# Patient Record
Sex: MALE | Race: WHITE | NOT HISPANIC OR LATINO | Employment: FULL TIME | URBAN - METROPOLITAN AREA
[De-identification: names, ages, dates, MRNs, and addresses within clinical notes are randomized per-mention and may not be internally consistent; named-entity substitution may affect disease eponyms.]

---

## 2017-06-13 ENCOUNTER — OFFICE VISIT (OUTPATIENT)
Dept: URGENT CARE | Facility: CLINIC | Age: 47
End: 2017-06-13
Payer: COMMERCIAL

## 2017-06-13 ENCOUNTER — APPOINTMENT (OUTPATIENT)
Dept: RADIOLOGY | Facility: IMAGING CENTER | Age: 47
End: 2017-06-13
Attending: NURSE PRACTITIONER
Payer: COMMERCIAL

## 2017-06-13 VITALS
HEIGHT: 78 IN | HEART RATE: 64 BPM | TEMPERATURE: 98.2 F | DIASTOLIC BLOOD PRESSURE: 78 MMHG | BODY MASS INDEX: 29.78 KG/M2 | SYSTOLIC BLOOD PRESSURE: 102 MMHG | OXYGEN SATURATION: 97 % | WEIGHT: 257.4 LBS

## 2017-06-13 DIAGNOSIS — S20.212A CONTUSION OF LEFT CHEST WALL, INITIAL ENCOUNTER: ICD-10-CM

## 2017-06-13 PROCEDURE — 99203 OFFICE O/P NEW LOW 30 MIN: CPT | Performed by: NURSE PRACTITIONER

## 2017-06-13 PROCEDURE — 71101 X-RAY EXAM UNILAT RIBS/CHEST: CPT | Mod: TC,LT | Performed by: NURSE PRACTITIONER

## 2017-06-13 RX ORDER — OXYCODONE HYDROCHLORIDE AND ACETAMINOPHEN 5; 325 MG/1; MG/1
1-2 TABLET ORAL EVERY 4 HOURS PRN
Qty: 20 TAB | Refills: 0 | Status: SHIPPED | OUTPATIENT
Start: 2017-06-13

## 2017-06-13 ASSESSMENT — ENCOUNTER SYMPTOMS
MYALGIAS: 0
CHILLS: 0
ABDOMINAL PAIN: 0
FEVER: 0
NECK PAIN: 0
BACK PAIN: 0
HEADACHES: 0
COUGH: 0
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
## 2017-06-13 NOTE — PATIENT INSTRUCTIONS
Chest Wall Pain  Chest wall pain is pain in or around the bones and muscles of your chest. It may take up to 6 weeks to get better. It may take longer if you must stay physically active in your work and activities.   CAUSES   Chest wall pain may happen on its own. However, it may be caused by:  · A viral illness like the flu.  · Injury.  · Coughing.  · Exercise.  · Arthritis.  · Fibromyalgia.  · Shingles.  HOME CARE INSTRUCTIONS   · Avoid overtiring physical activity. Try not to strain or perform activities that cause pain. This includes any activities using your chest or your abdominal and side muscles, especially if heavy weights are used.  · Put ice on the sore area.  ¨ Put ice in a plastic bag.  ¨ Place a towel between your skin and the bag.  ¨ Leave the ice on for 15-20 minutes per hour while awake for the first 2 days.  · Only take over-the-counter or prescription medicines for pain, discomfort, or fever as directed by your caregiver.  SEEK IMMEDIATE MEDICAL CARE IF:   · Your pain increases, or you are very uncomfortable.  · You have a fever.  · Your chest pain becomes worse.  · You have new, unexplained symptoms.  · You have nausea or vomiting.  · You feel sweaty or lightheaded.  · You have a cough with phlegm (sputum), or you cough up blood.  MAKE SURE YOU:   · Understand these instructions.  · Will watch your condition.  · Will get help right away if you are not doing well or get worse.     This information is not intended to replace advice given to you by your health care provider. Make sure you discuss any questions you have with your health care provider.     Document Released: 12/18/2006 Document Revised: 03/11/2013 Document Reviewed: 03/14/2016  [employer] Interactive Patient Education ©2016 [employer]
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
## 2017-08-28 ENCOUNTER — HOSPITAL ENCOUNTER (OUTPATIENT)
Dept: RADIOLOGY | Facility: MEDICAL CENTER | Age: 47
End: 2017-08-28
Attending: CHIROPRACTOR
Payer: COMMERCIAL

## 2017-08-28 DIAGNOSIS — M99.03 SOMATIC DYSFUNCTION OF LUMBAR REGION: ICD-10-CM

## 2017-08-28 DIAGNOSIS — M99.02 THORACIC SEGMENT DYSFUNCTION: ICD-10-CM

## 2017-08-28 DIAGNOSIS — M99.04 SOMATIC DYSFUNCTION OF SACRAL REGION: ICD-10-CM

## 2017-08-28 DIAGNOSIS — M99.01 CERVICAL SEGMENT DYSFUNCTION: ICD-10-CM

## 2017-08-28 PROCEDURE — 72052 X-RAY EXAM NECK SPINE 6/>VWS: CPT

## 2017-08-28 PROCEDURE — 72072 X-RAY EXAM THORAC SPINE 3VWS: CPT

## 2017-08-28 PROCEDURE — 72170 X-RAY EXAM OF PELVIS: CPT

## 2017-08-28 PROCEDURE — 72100 X-RAY EXAM L-S SPINE 2/3 VWS: CPT

## 2017-10-14 ENCOUNTER — OFFICE VISIT (OUTPATIENT)
Dept: URGENT CARE | Facility: CLINIC | Age: 47
End: 2017-10-14
Payer: COMMERCIAL

## 2017-10-14 VITALS
HEIGHT: 78 IN | TEMPERATURE: 97.8 F | SYSTOLIC BLOOD PRESSURE: 126 MMHG | BODY MASS INDEX: 29.9 KG/M2 | HEART RATE: 72 BPM | DIASTOLIC BLOOD PRESSURE: 60 MMHG | RESPIRATION RATE: 14 BRPM | OXYGEN SATURATION: 97 % | WEIGHT: 258.4 LBS

## 2017-10-14 DIAGNOSIS — R05.9 COUGH: ICD-10-CM

## 2017-10-14 DIAGNOSIS — J02.9 PHARYNGITIS, UNSPECIFIED ETIOLOGY: ICD-10-CM

## 2017-10-14 PROCEDURE — 99214 OFFICE O/P EST MOD 30 MIN: CPT | Performed by: PHYSICIAN ASSISTANT

## 2017-10-14 RX ORDER — AZITHROMYCIN 250 MG/1
TABLET, FILM COATED ORAL
Qty: 6 TAB | Refills: 1 | Status: SHIPPED | OUTPATIENT
Start: 2017-10-14

## 2017-10-14 ASSESSMENT — ENCOUNTER SYMPTOMS
CARDIOVASCULAR NEGATIVE: 1
COUGH: 1
CONSTITUTIONAL NEGATIVE: 1
EYES NEGATIVE: 1
SPUTUM PRODUCTION: 0
SORE THROAT: 1
FEVER: 0
SHORTNESS OF BREATH: 0

## 2017-10-14 NOTE — PROGRESS NOTES
"Subjective:      Tristan Gaspar is a 47 y.o. male who presents with Cough (x1week, dry cough, chest congestion, body aches, chills, fever, throat irritation)            Cough   This is a new problem. The current episode started in the past 7 days (cough, st, aches). The problem has been unchanged. The problem occurs every few minutes. The cough is non-productive. Associated symptoms include a sore throat. Pertinent negatives include no fever or shortness of breath. Nothing aggravates the symptoms. He has tried nothing for the symptoms. The treatment provided no relief. There is no history of asthma or environmental allergies.       Review of Systems   Constitutional: Negative.  Negative for fever.   HENT: Positive for sore throat.    Eyes: Negative.    Respiratory: Positive for cough. Negative for sputum production and shortness of breath.    Cardiovascular: Negative.    Skin: Negative.    Endo/Heme/Allergies: Negative for environmental allergies.          Objective:     /60   Pulse 72   Temp 36.6 °C (97.8 °F)   Resp 14   Ht 2.007 m (6' 7\")   Wt 117.2 kg (258 lb 6.4 oz)   SpO2 97%   BMI 29.11 kg/m²      Physical Exam   Constitutional: He is oriented to person, place, and time. He appears well-developed and well-nourished. No distress.   HENT:   Head: Normocephalic and atraumatic.   +phar./tons redn     Eyes: EOM are normal. Pupils are equal, round, and reactive to light.   Neck: Normal range of motion. Neck supple.   Cardiovascular: Normal rate.    Pulmonary/Chest: Effort normal and breath sounds normal. No respiratory distress.   Lymphadenopathy:     He has cervical adenopathy.   Neurological: He is alert and oriented to person, place, and time.   Skin: Skin is warm and dry.   Psychiatric: He has a normal mood and affect. His behavior is normal. Thought content normal.   Nursing note and vitals reviewed.    Vitals:    10/14/17 1150   BP: 126/60   Pulse: 72   Resp: 14   Temp: 36.6 °C (97.8 °F)   SpO2: " "97%   Weight: 117.2 kg (258 lb 6.4 oz)   Height: 2.007 m (6' 7\")     Active Ambulatory Problems     Diagnosis Date Noted   • No Active Ambulatory Problems     Resolved Ambulatory Problems     Diagnosis Date Noted   • No Resolved Ambulatory Problems     No Additional Past Medical History     Current Outpatient Prescriptions on File Prior to Visit   Medication Sig Dispense Refill   • oxycodone-acetaminophen (PERCOCET) 5-325 MG Tab Take 1-2 Tabs by mouth every four hours as needed for Moderate Pain or Severe Pain (pain). 20 Tab 0   • ondansetron (ZOFRAN) 4 MG TABS Take 1 Tab by mouth every 8 hours as needed for Nausea/Vomiting. 10 Each 1   • tamsulosin (FLOMAX) 0.4 MG capsule Take 1 Cap by mouth ONE-HALF HOUR AFTER BREAKFAST. 30 Cap 0     No current facility-administered medications on file prior to visit.      Gargles, Cepacol lozenges, Aleve/Advil as needed for throat pain  History reviewed. No pertinent family history.  Review of patient's allergies indicates no known allergies.              Assessment/Plan:     ·  tonsillitis, cough      · Start w/MucinexD; nsaids; [hold rx abx prn sx persist/worsen]  ·        "

## 2017-11-01 ENCOUNTER — TELEPHONE (OUTPATIENT)
Dept: URGENT CARE | Facility: CLINIC | Age: 47
End: 2017-11-01

## 2017-11-01 DIAGNOSIS — R05.9 COUGH: ICD-10-CM

## 2017-11-01 RX ORDER — LEVOFLOXACIN 500 MG/1
500 TABLET, FILM COATED ORAL DAILY
Qty: 10 TAB | Refills: 0 | Status: SHIPPED | OUTPATIENT
Start: 2017-11-01 | End: 2017-11-11

## 2018-11-27 ENCOUNTER — APPOINTMENT (RX ONLY)
Dept: URBAN - METROPOLITAN AREA CLINIC 20 | Facility: CLINIC | Age: 48
Setting detail: DERMATOLOGY
End: 2018-11-27

## 2018-11-27 DIAGNOSIS — L81.4 OTHER MELANIN HYPERPIGMENTATION: ICD-10-CM

## 2018-11-27 DIAGNOSIS — L57.0 ACTINIC KERATOSIS: ICD-10-CM

## 2018-11-27 DIAGNOSIS — L21.8 OTHER SEBORRHEIC DERMATITIS: ICD-10-CM | Status: WELL CONTROLLED

## 2018-11-27 PROBLEM — D48.5 NEOPLASM OF UNCERTAIN BEHAVIOR OF SKIN: Status: ACTIVE | Noted: 2018-11-27

## 2018-11-27 PROCEDURE — ? LIQUID NITROGEN

## 2018-11-27 PROCEDURE — ? COUNSELING

## 2018-11-27 PROCEDURE — 17000 DESTRUCT PREMALG LESION: CPT

## 2018-11-27 PROCEDURE — 17003 DESTRUCT PREMALG LES 2-14: CPT

## 2018-11-27 PROCEDURE — ? BIOPSY BY SHAVE METHOD

## 2018-11-27 PROCEDURE — ? PRESCRIPTION

## 2018-11-27 PROCEDURE — 11100: CPT | Mod: 59

## 2018-11-27 PROCEDURE — 99213 OFFICE O/P EST LOW 20 MIN: CPT | Mod: 25

## 2018-11-27 PROCEDURE — ? OBSERVATION AND MEASURE

## 2018-11-27 RX ORDER — KETOCONAZOLE 20.5 MG/ML
SHAMPOO, SUSPENSION TOPICAL DAILY
Qty: 1 | Refills: 6 | Status: ERX | COMMUNITY
Start: 2018-11-27

## 2018-11-27 RX ADMIN — KETOCONAZOLE: 20.5 SHAMPOO, SUSPENSION TOPICAL at 19:07

## 2018-11-27 ASSESSMENT — LOCATION DETAILED DESCRIPTION DERM
LOCATION DETAILED: RIGHT SUPERIOR OCCIPITAL SCALP
LOCATION DETAILED: RIGHT LATERAL TEMPLE
LOCATION DETAILED: POSTERIOR MID-PARIETAL SCALP
LOCATION DETAILED: RIGHT SUPERIOR PARIETAL SCALP
LOCATION DETAILED: LEFT SUPERIOR PARIETAL SCALP
LOCATION DETAILED: LEFT CENTRAL MALAR CHEEK
LOCATION DETAILED: LEFT SUPERIOR OCCIPITAL SCALP

## 2018-11-27 ASSESSMENT — LOCATION SIMPLE DESCRIPTION DERM
LOCATION SIMPLE: SCALP
LOCATION SIMPLE: POSTERIOR SCALP
LOCATION SIMPLE: LEFT CHEEK
LOCATION SIMPLE: RIGHT TEMPLE
LOCATION SIMPLE: LEFT OCCIPITAL SCALP
LOCATION SIMPLE: RIGHT OCCIPITAL SCALP

## 2018-11-27 ASSESSMENT — LOCATION ZONE DERM
LOCATION ZONE: FACE
LOCATION ZONE: SCALP

## 2018-11-27 NOTE — PROCEDURE: BIOPSY BY SHAVE METHOD
Anesthesia Type: 1% lidocaine with 1:100,000 epinephrine and a 1:6 solution of 8.4% sodium bicarbonate
Consent: Written consent was obtained and risks were reviewed including but not limited to scarring, infection, bleeding, scabbing, incomplete removal, nerve damage and allergy to anesthesia.
Depth Of Biopsy: dermis
Lab: [id number]
Lab Facility: 
Size Of Lesion In Cm: 0.6
Cryotherapy Text: The wound bed was treated with cryotherapy after the biopsy was performed.
Destruction After The Procedure: No
Wound Care: Aquaphor
Billing Type: Third-Party Bill
X Size Of Lesion In Cm: 0.3
Biopsy Type: H and E
Additional Anesthesia Volume In Cc (Will Not Render If 0): 0
Electrodesiccation Text: The wound bed was treated with electrodesiccation after the biopsy was performed.
Was A Bandage Applied: Yes
Electrodesiccation And Curettage Text: The wound bed was treated with electrodesiccation and curettage after the biopsy was performed.
Type Of Destruction Used: Curettage
Anesthesia Volume In Cc: 0.2
Biopsy Method: Personna blade
Dressing: Band-Aid
Post-Care Instructions: I reviewed with the patient in detail post-care instructions. Patient is to keep the biopsy site dry overnight, and then apply bacitracin twice daily until healed. Patient may apply hydrogen peroxide soaks to remove any crusting.
Hemostasis: Drysol and Electrocautery
Silver Nitrate Text: The wound bed was treated with silver nitrate after the biopsy was performed.
Curettage Text: The wound bed was treated with curettage after the biopsy was performed.
Detail Level: Detailed
Notification Instructions: Patient will be notified of biopsy results. However, patient instructed to call the office if not contacted within 2 weeks.

## 2018-11-27 NOTE — HPI: RASH (SEBORRHEIC DERMATITIS)
How Severe Is It?: mild
Is This A New Presentation, Or A Follow-Up?: Follow Up Seborrheic Dermatitis
Additional History: Clyndamycin solution  Clobetasol foam